# Patient Record
Sex: FEMALE | Race: NATIVE HAWAIIAN OR OTHER PACIFIC ISLANDER | NOT HISPANIC OR LATINO | Employment: UNEMPLOYED | URBAN - METROPOLITAN AREA
[De-identification: names, ages, dates, MRNs, and addresses within clinical notes are randomized per-mention and may not be internally consistent; named-entity substitution may affect disease eponyms.]

---

## 2017-02-05 ENCOUNTER — APPOINTMENT (OUTPATIENT)
Dept: GENERAL RADIOLOGY | Facility: CLINIC | Age: 22
End: 2017-02-05
Attending: NURSE PRACTITIONER
Payer: COMMERCIAL

## 2017-02-05 ENCOUNTER — HOSPITAL ENCOUNTER (EMERGENCY)
Facility: CLINIC | Age: 22
Discharge: HOME | End: 2017-02-05
Attending: NURSE PRACTITIONER | Admitting: NURSE PRACTITIONER
Payer: COMMERCIAL

## 2017-02-05 ENCOUNTER — APPOINTMENT (OUTPATIENT)
Dept: CT IMAGING | Facility: CLINIC | Age: 22
End: 2017-02-05
Attending: NURSE PRACTITIONER
Payer: COMMERCIAL

## 2017-02-05 VITALS
TEMPERATURE: 98 F | BODY MASS INDEX: 34.39 KG/M2 | SYSTOLIC BLOOD PRESSURE: 135 MMHG | OXYGEN SATURATION: 100 % | WEIGHT: 185 LBS | DIASTOLIC BLOOD PRESSURE: 82 MMHG | RESPIRATION RATE: 18 BRPM | HEART RATE: 76 BPM
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
## 2017-02-05 DIAGNOSIS — S46.811A STRAIN OF TRAPEZIUS MUSCLE, RIGHT, INITIAL ENCOUNTER: ICD-10-CM

## 2017-02-05 LAB
ANION GAP SERPL CALCULATED.3IONS-SCNC: 11 MMOL/L (ref 3–14)
BASOPHILS # BLD AUTO: 0 10E9/L (ref 0–0.2)
BASOPHILS NFR BLD AUTO: 0.4 %
BUN SERPL-MCNC: 14 MG/DL (ref 7–30)
CALCIUM SERPL-MCNC: 8.9 MG/DL (ref 8.5–10.1)
CHLORIDE SERPL-SCNC: 107 MMOL/L (ref 94–109)
CO2 SERPL-SCNC: 21 MMOL/L (ref 20–32)
CREAT SERPL-MCNC: 0.69 MG/DL (ref 0.52–1.04)
DIFFERENTIAL METHOD BLD: ABNORMAL
EOSINOPHIL # BLD AUTO: 0.2 10E9/L (ref 0–0.7)
EOSINOPHIL NFR BLD AUTO: 1.4 %
ERYTHROCYTE [DISTWIDTH] IN BLOOD BY AUTOMATED COUNT: 12.8 % (ref 10–15)
GFR SERPL CREATININE-BSD FRML MDRD: ABNORMAL ML/MIN/1.7M2
GLUCOSE SERPL-MCNC: 149 MG/DL (ref 70–99)
HCG UR QL: NEGATIVE
HCT VFR BLD AUTO: 42.7 % (ref 35–47)
HGB BLD-MCNC: 15 G/DL (ref 11.7–15.7)
IMM GRANULOCYTES # BLD: 0 10E9/L (ref 0–0.4)
IMM GRANULOCYTES NFR BLD: 0.1 %
LYMPHOCYTES # BLD AUTO: 2.2 10E9/L (ref 0.8–5.3)
LYMPHOCYTES NFR BLD AUTO: 19.7 %
MCH RBC QN AUTO: 27.7 PG (ref 26.5–33)
MCHC RBC AUTO-ENTMCNC: 35.1 G/DL (ref 31.5–36.5)
MCV RBC AUTO: 79 FL (ref 78–100)
MONOCYTES # BLD AUTO: 0.5 10E9/L (ref 0–1.3)
MONOCYTES NFR BLD AUTO: 4.8 %
NEUTROPHILS # BLD AUTO: 8.2 10E9/L (ref 1.6–8.3)
NEUTROPHILS NFR BLD AUTO: 73.6 %
PLATELET # BLD AUTO: 234 10E9/L (ref 150–450)
POTASSIUM SERPL-SCNC: 4.1 MMOL/L (ref 3.4–5.3)
RBC # BLD AUTO: 5.42 10E12/L (ref 3.8–5.2)
SODIUM SERPL-SCNC: 139 MMOL/L (ref 133–144)
WBC # BLD AUTO: 11.1 10E9/L (ref 4–11)

## 2017-02-05 PROCEDURE — 99284 EMERGENCY DEPT VISIT MOD MDM: CPT | Mod: 25

## 2017-02-05 PROCEDURE — 81025 URINE PREGNANCY TEST: CPT | Performed by: NURSE PRACTITIONER

## 2017-02-05 PROCEDURE — 25000132 ZZH RX MED GY IP 250 OP 250 PS 637: Performed by: NURSE PRACTITIONER

## 2017-02-05 PROCEDURE — 99284 EMERGENCY DEPT VISIT MOD MDM: CPT | Performed by: NURSE PRACTITIONER

## 2017-02-05 PROCEDURE — 72125 CT NECK SPINE W/O DYE: CPT

## 2017-02-05 PROCEDURE — 71101 X-RAY EXAM UNILAT RIBS/CHEST: CPT | Mod: TC,RT

## 2017-02-05 PROCEDURE — 85025 COMPLETE CBC W/AUTO DIFF WBC: CPT | Performed by: NURSE PRACTITIONER

## 2017-02-05 PROCEDURE — 80048 BASIC METABOLIC PNL TOTAL CA: CPT | Performed by: NURSE PRACTITIONER

## 2017-02-05 RX ORDER — ACETAMINOPHEN 325 MG/1
975 TABLET ORAL ONCE
Status: COMPLETED | OUTPATIENT
Start: 2017-02-05 | End: 2017-02-05

## 2017-02-05 RX ADMIN — ACETAMINOPHEN 975 MG: 325 TABLET ORAL at 12:34

## 2017-02-05 ASSESSMENT — ENCOUNTER SYMPTOMS
NECK PAIN: 1
HEADACHES: 1
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
## 2017-02-05 NOTE — ED AVS SNAPSHOT
[facility] Emergency Department    [address] [place] [address]    Phone:  [phone]    Fax:  [phone]                                       Rosa Vera   MRN: [id number]    Department:  [facility] Emergency Department   Date of Visit:  2/5/2017           After Visit Summary Signature Page     I have received my discharge instructions, and my questions have been answered. I have discussed any challenges I see with this plan with the nurse or doctor.    ..........................................................................................................................................  Patient/Patient Representative Signature      ..........................................................................................................................................  Patient Representative Print Name and Relationship to Patient    ..................................................               ................................................  Date                                            Time    ..........................................................................................................................................  Reviewed by Signature/Title    ...................................................              ..............................................  Date                                                            Time
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
## 2017-03-26 DIAGNOSIS — Z30.011 ENCOUNTER FOR INITIAL PRESCRIPTION OF CONTRACEPTIVE PILLS: ICD-10-CM

## 2017-03-27 NOTE — TELEPHONE ENCOUNTER
contraceptive      Last Written Prescription Date: 8/8/16  Last Fill Quantity: 84,  # refills: 2   Last Office Visit with G, [facility] or [facility] prescribing provider: 7/5/16

## 2017-03-28 RX ORDER — NORETHINDRONE ACETATE AND ETHINYL ESTRADIOL 1MG-20(21)
KIT ORAL
Qty: 84 TABLET | Refills: 0 | Status: SHIPPED | OUTPATIENT
Start: 2017-03-28 | End: 2017-06-19

## 2017-03-28 NOTE — TELEPHONE ENCOUNTER
Medication is being filled for 1 time refill only due to:  Patient needs to be seen because it has been more than one year since last visit.     Matias Macias RN, BSN

## 2017-09-28 DIAGNOSIS — Z30.011 ENCOUNTER FOR INITIAL PRESCRIPTION OF CONTRACEPTIVE PILLS: ICD-10-CM

## 2017-09-29 RX ORDER — NORETHINDRONE ACETATE AND ETHINYL ESTRADIOL 1MG-20(21)
KIT ORAL
Qty: 84 TABLET | Refills: 0 | Status: SHIPPED | OUTPATIENT
Start: 2017-09-29 | End: 2017-12-20

## 2017-09-29 NOTE — TELEPHONE ENCOUNTER
Routing refill request to provider for review/approval because:  Trini given x1 and patient did not follow up, please advise.    Routing to PCP for refill if appropriate.  Routing to schedulers for appointment with PCP.    Liat Lindsay RN

## 2017-09-29 NOTE — TELEPHONE ENCOUNTER
Left message for patient to call back and speak with any .  Thank you,  Lu Gavin  Patient Representative

## 2017-09-29 NOTE — TELEPHONE ENCOUNTER
Blisovi      Last Written Prescription Date: 7/28/2017  Last Fill Quantity: 84,  # refills: 0   Last Office Visit with G, [facility] or [facility] prescribing provider: 7/05/2016

## 2017-09-29 NOTE — TELEPHONE ENCOUNTER
Patient returned call and scheduled appt with PCP.  Thank you,  Lu Gavin  Patient Representative

## 2017-12-20 ENCOUNTER — OFFICE VISIT (OUTPATIENT)
Dept: FAMILY MEDICINE | Facility: CLINIC | Age: 22
End: 2017-12-20
Payer: COMMERCIAL

## 2017-12-20 VITALS
TEMPERATURE: 96.3 F | HEART RATE: 94 BPM | DIASTOLIC BLOOD PRESSURE: 72 MMHG | BODY MASS INDEX: 39.93 KG/M2 | SYSTOLIC BLOOD PRESSURE: 104 MMHG | WEIGHT: 217 LBS | OXYGEN SATURATION: 98 % | HEIGHT: 62 IN

## 2017-12-20 DIAGNOSIS — Z86.32 HISTORY OF GESTATIONAL DIABETES MELLITUS: ICD-10-CM

## 2017-12-20 DIAGNOSIS — Z00.00 ENCOUNTER FOR ROUTINE ADULT HEALTH EXAMINATION WITHOUT ABNORMAL FINDINGS: Primary | ICD-10-CM

## 2017-12-20 DIAGNOSIS — Z23 NEED FOR PROPHYLACTIC VACCINATION AND INOCULATION AGAINST INFLUENZA: ICD-10-CM

## 2017-12-20 DIAGNOSIS — Z13.6 CARDIOVASCULAR SCREENING; LDL GOAL LESS THAN 160: ICD-10-CM

## 2017-12-20 DIAGNOSIS — Z30.41 SURVEILLANCE OF CONTRACEPTIVE PILL: ICD-10-CM

## 2017-12-20 LAB
CHOLEST SERPL-MCNC: 161 MG/DL
HBA1C MFR BLD: 5.8 % (ref 4.3–6)
HDLC SERPL-MCNC: 46 MG/DL
LDLC SERPL CALC-MCNC: 93 MG/DL
NONHDLC SERPL-MCNC: 115 MG/DL
TRIGL SERPL-MCNC: 110 MG/DL

## 2017-12-20 PROCEDURE — 87591 N.GONORRHOEAE DNA AMP PROB: CPT | Performed by: FAMILY MEDICINE

## 2017-12-20 PROCEDURE — 80061 LIPID PANEL: CPT | Performed by: FAMILY MEDICINE

## 2017-12-20 PROCEDURE — 90471 IMMUNIZATION ADMIN: CPT | Performed by: FAMILY MEDICINE

## 2017-12-20 PROCEDURE — 99395 PREV VISIT EST AGE 18-39: CPT | Mod: 25 | Performed by: FAMILY MEDICINE

## 2017-12-20 PROCEDURE — 36415 COLL VENOUS BLD VENIPUNCTURE: CPT | Performed by: FAMILY MEDICINE

## 2017-12-20 PROCEDURE — 83036 HEMOGLOBIN GLYCOSYLATED A1C: CPT | Performed by: FAMILY MEDICINE

## 2017-12-20 PROCEDURE — 90686 IIV4 VACC NO PRSV 0.5 ML IM: CPT | Performed by: FAMILY MEDICINE

## 2017-12-20 PROCEDURE — 87491 CHLMYD TRACH DNA AMP PROBE: CPT | Performed by: FAMILY MEDICINE

## 2017-12-20 RX ORDER — NORETHINDRONE ACETATE AND ETHINYL ESTRADIOL 1MG-20(21)
KIT ORAL
Qty: 84 TABLET | Refills: 3 | Status: SHIPPED | OUTPATIENT
Start: 2017-12-20 | End: 2018-12-06

## 2017-12-20 ASSESSMENT — PAIN SCALES - GENERAL: PAINLEVEL: NO PAIN (0)

## 2017-12-20 NOTE — MR AVS SNAPSHOT
After Visit Summary   12/20/2017    Rosa Vera    MRN: [id number]           Patient Information     Date Of Birth          1995        Visit Information        Provider Department      12/20/2017 10:45 AM Louisa Isaac MD [facility]        Today's Diagnoses     History of gestational diabetes mellitus    -  1    Screening examination for venereal disease        Screening for malignant neoplasm of cervix        Need for HPV vaccine        Need for prophylactic vaccination and inoculation against influenza        Encounter for initial prescription of contraceptive pills        CARDIOVASCULAR SCREENING; LDL GOAL LESS THAN 160          Care Instructions      Preventive Health Recommendations  Female Ages 18 to 25     Yearly exam:     See your health care provider every year in order to  o Review health changes.   o Discuss preventive care.    o Review your medicines if your doctor has prescribed any.      You should be tested each year for STDs (sexually transmitted diseases).       After age 20, talk to your provider about how often you should have cholesterol testing.      Starting at age 21, get a Pap test every three years. If you have an abnormal result, your doctor may have you test more often.      If you are at risk for diabetes, you should have a diabetes test (fasting glucose).     Shots:     Get a flu shot each year.     Get a tetanus shot every 10 years.     Consider getting the shot (vaccine) that prevents cervical cancer (Gardasil).    Nutrition:     Eat at least 5 servings of fruits and vegetables each day.    Eat whole-grain bread, whole-wheat pasta and brown rice instead of white grains and rice.    Talk to your provider about Calcium and Vitamin D.     Lifestyle    Exercise at least 150 minutes a week each week (30 minutes a day, 5 days a week). This will help you control your weight and prevent disease.    Limit alcohol to one drink per day.    No  "smoking.     Wear sunscreen to prevent skin cancer.    See your dentist every six months for an exam and cleaning.          Follow-ups after your visit        Who to contact     If you have questions or need follow up information about today's clinic visit or your schedule please contact [facility] directly at [phone].  Normal or non-critical lab and imaging results will be communicated to you by MyChart, letter or phone within 4 business days after the clinic has received the results. If you do not hear from us within 7 days, please contact the clinic through maniaTVhart or phone. If you have a critical or abnormal lab result, we will notify you by phone as soon as possible.  Submit refill requests through [employer]ch[employer] or call your pharmacy and they will forward the refill request to us. Please allow 3 business days for your refill to be completed.          Additional Information About Your Visit        MyChart Information     [employer] gives you secure access to your electronic health record. If you see a primary care provider, you can also send messages to your care team and make appointments. If you have questions, please call your primary care clinic.  If you do not have a primary care provider, please call [phone] and they will assist you.        Care EveryWhere ID     This is your Care EveryWhere ID. This could be used by other organizations to access your [facility] medical records  [id number]        Your Vitals Were     Pulse Temperature Height Last Period Pulse Oximetry Breastfeeding?    94 96.3  F (35.7  C) (Temporal) 5' 1.5\" (1.562 m) 12/20/2017 (Exact Date) 98% No    BMI (Body Mass Index)                   40.34 kg/m2            Blood Pressure from Last 3 Encounters:   12/20/17 104/72   02/05/17 135/82   07/05/16 132/54    Weight from Last 3 Encounters:   12/20/17 217 lb (98.4 kg)   02/05/17 185 lb (83.9 kg)   07/05/16 190 lb 3.2 oz (86.3 kg)              We Performed the Following     " CHLAMYDIA TRACHOMATIS PCR     FLU VAC, SPLIT VIRUS IM > 3 YO (QUADRIVALENT) [97773]     Hemoglobin A1c     Lipid panel reflex to direct LDL Non-fasting     NEISSERIA GONORRHOEA PCR     Vaccine Administration, Initial [28558]          Today's Medication Changes          These changes are accurate as of: 12/20/17 12:00 PM.  If you have any questions, ask your nurse or doctor.               These medicines have changed or have updated prescriptions.        Dose/Directions    norethindrone-ethinyl estradiol 1-20 MG-MCG per tablet   Commonly known as:  BLISOVI FE 1/20   This may have changed:  See the new instructions.   Used for:  Encounter for initial prescription of contraceptive pills   Changed by:  Louisa Isaac MD        TAKE 1 TABLET DAILY (DUE FOR OFFICE VISIT PRIOR TO FURTHER REFILLS)   Quantity:  84 tablet   Refills:  3            Where to get your medicines      These medications were sent to [facility] - [address] [address]     Phone:  [phone]     norethindrone-ethinyl estradiol 1-20 MG-MCG per tablet                Primary Care Provider Office Phone # Fax #    Louisa Isaac [phone]       [address] [place] [address]        Equal Access to Services     JOSE HICKEY AH: Hadii eloy ku hadasho Soomaali, waaxda luqadaha, qaybta kaalmada adeegyada, waxay malloryin haylynn hill. So [facility] [phone].    ATENCIÓN: Si habla español, tiene a baer disposición servicios gratuitos de asistencia lingüística. Llame al [phone].    We comply with applicable federal civil rights laws and [place] laws. We do not discriminate on the basis of race, color, national origin, age, disability, sex, sexual orientation, or gender identity.            Thank you!     Thank you for choosing [facility]  for your care. Our goal is always to provide you with excellent  care. Hearing back from our patients is one way we can continue to improve our services. Please take a few minutes to complete the written survey that you may receive in the mail after your visit with us. Thank you!             Your Updated Medication List - Protect others around you: Learn how to safely use, store and throw away your medicines at www.disposemymeds.org.          This list is accurate as of: 12/20/17 12:00 PM.  Always use your most recent med list.                   Brand Name Dispense Instructions for use Diagnosis    norethindrone-ethinyl estradiol 1-20 MG-MCG per tablet    BLISOVI FE 1/20    84 tablet    TAKE 1 TABLET DAILY (DUE FOR OFFICE VISIT PRIOR TO FURTHER REFILLS)    Encounter for initial prescription of contraceptive pills

## 2017-12-20 NOTE — PROGRESS NOTES
SUBJECTIVE:   CC: Rosa Vera is an 22 year old woman who presents for preventive health visit.     Physical   Annual:     Getting at least 3 servings of Calcium per day::  NO    Bi-annual eye exam::  Yes    Dental care twice a year::  Yes    Sleep apnea or symptoms of sleep apnea::  None    Diet::  Regular (no restrictions)    Frequency of exercise::  None    Taking medications regularly::  Yes    Medication side effects::  None    Additional concerns today::  No          QUESTIONS/ CONCERNS: Patient does not have any questions/ concerns at this visit.       Today's PHQ-2 Score:   PHQ-2 ( 1999 Pfizer) 12/18/2017   Q1: Little interest or pleasure in doing things 0   Q2: Feeling down, depressed or hopeless 0   PHQ-2 Score 0   Q1: Little interest or pleasure in doing things Not at all   Q2: Feeling down, depressed or hopeless Not at all   PHQ-2 Score 0     Answers for HPI/ROS submitted by the patient on 12/18/2017   PHQ-2 Score: 0    Abuse: Current or Past(Physical, Sexual or Emotional)- No  Do you feel safe in your environment - Yes    Social History   Substance Use Topics     Smoking status: Never Smoker     Smokeless tobacco: Never Used      Comment: Mike MOORE smokes     Alcohol use No     Alcohol Use 12/18/2017   If you drink alcohol, do you typically have greater than 3 drinks per day OR greater than 7 drinks per week?   Not applicable   No flowsheet data found.      Reviewed orders with patient.  Reviewed health maintenance and updated orders accordingly - Yes      Mammogram not appropriate for this patient based on age.    Pertinent mammograms are reviewed under the imaging tab.  History of abnormal Pap smear: NO - age 21-29 PAP every 3 years recommended    Reviewed and updated as needed this visit by clinical staffTobacco  Allergies  Meds  Med Hx  Surg Hx  Fam Hx  Soc Hx        Reviewed and updated as needed this visit by Provider        Past Medical History:   Diagnosis Date     GDM, class A2  "12/2/2015     NO ACTIVE PROBLEMS (aka NONE)       Past Surgical History:   Procedure Laterality Date     NO HISTORY OF SURGERY         Review of Systems  C: NEGATIVE for fever, chills, change in weight  I: NEGATIVE for worrisome rashes, moles or lesions  E: NEGATIVE for vision changes or irritation  ENT: NEGATIVE for ear, mouth and throat problems  R: NEGATIVE for significant cough or SOB  B: NEGATIVE for masses, tenderness or discharge  CV: NEGATIVE for chest pain, palpitations or peripheral edema  GI: NEGATIVE for nausea, abdominal pain, heartburn, or change in bowel habits  : NEGATIVE for unusual urinary or vaginal symptoms. Periods are regular on OCPs.  M: NEGATIVE for significant arthralgias or myalgia  N: NEGATIVE for weakness, dizziness or paresthesias  P: NEGATIVE for changes in mood or affect     OBJECTIVE:   /72  Pulse 94  Temp 96.3  F (35.7  C) (Temporal)  Ht 5' 1.5\" (1.562 m)  Wt 217 lb (98.4 kg)  LMP 12/20/2017 (Exact Date)  SpO2 98%  Breastfeeding? No  BMI 40.34 kg/m2  Physical Exam  GENERAL: healthy, alert and no distress  EYES: Eyes grossly normal to inspection, PERRL and conjunctivae and sclerae normal  HENT: ear canals and TM's normal, nose and mouth without ulcers or lesions  NECK: no adenopathy, no asymmetry, masses, or scars and thyroid normal to palpation  RESP: lungs clear to auscultation - no rales, rhonchi or wheezes  BREAST: normal without masses, tenderness or nipple discharge and no palpable axillary masses or adenopathy  CV: regular rate and rhythm, normal S1 S2, no S3 or S4, no murmur, click or rub, no peripheral edema and peripheral pulses strong  ABDOMEN: soft, nontender, no hepatosplenomegaly, no masses and bowel sounds normal  MS: no gross musculoskeletal defects noted, no edema  SKIN: no suspicious lesions or rashes  NEURO: Normal strength and tone, mentation intact and speech normal  PSYCH: mentation appears normal, affect normal/bright  PELVIC: Not done - PAP not " due, no concerns.     Diagnostics:  Orders Placed This Encounter   Procedures     Hemoglobin A1c     Lipid panel reflex to direct LDL Non-fasting       ASSESSMENT/PLAN:       ICD-10-CM    1. Encounter for routine adult health examination without abnormal findings Z00.00 NEISSERIA GONORRHOEA PCR     CHLAMYDIA TRACHOMATIS PCR   2. History of gestational diabetes mellitus Z86.32 Hemoglobin A1c     Lipid panel reflex to direct LDL Non-fasting   3. Need for prophylactic vaccination and inoculation against influenza Z23 FLU VAC, SPLIT VIRUS IM > 3 YO (QUADRIVALENT) [93447]     Vaccine Administration, Initial [73600]   4. CARDIOVASCULAR SCREENING; LDL GOAL LESS THAN 160 Z13.6 Lipid panel reflex to direct LDL Non-fasting   5. Surveillance of contraceptive pill Z30.41 norethindrone-ethinyl estradiol (BLISOVI FE 1/20) 1-20 MG-MCG per tablet   6. Class 3 obesity due to excess calories without serious comorbidity with body mass index (BMI) of 40.0 to 44.9 in adult (H) E66.01     Z68.41        - PAP was NIL in 6/2015. PAP recommended every 3 years, will repeat in 2018.   - see lab orders, will notify with results and discuss further evaluation/ treatment if needed at that time.   - Refills given as needed, see orders.   - Mammogram recommended q 1-2 years beginning at age 40. Mammogram not indicated for patient due to age. Patient will continue with routine self breast exams and notify me with concerns.   - Colonoscopy recommended beginning at age 50. Colonoscopy not indicated for patient due to age. Patient will notify me with concerns.   - Influenza Vaccination given. Reviewed, vaccinations are otherwise UTD.   - Follow up for routine physical in 1 year, or sooner if needed.       COUNSELING:  Reviewed preventive health counseling, as reflected in patient instructions       Regular exercise       Healthy diet/nutrition       Vision screening       Hearing screening       Immunizations    Vaccinated for: Influenza          "Contraception       Family planning       Safe sex practices/STD prevention  Strongly recommend weight loss to reduce diabetic risk and other CV disease risk.      reports that she has never smoked. She has never used smokeless tobacco.    Estimated body mass index is 40.34 kg/(m^2) as calculated from the following:    Height as of this encounter: 5' 1.5\" (1.562 m).    Weight as of this encounter: 217 lb (98.4 kg).   Weight management plan: Discussed healthy diet and exercise guidelines and patient will follow up in 12 months in clinic to re-evaluate.    Counseling Resources:  ATP IV Guidelines  Pooled Cohorts Equation Calculator  Breast Cancer Risk Calculator  FRAX Risk Assessment  ICSI Preventive Guidelines  Dietary Guidelines for Americans, 2010  [employer]'s MyPlate  ASA Prophylaxis  Lung CA Screening    This document serves as a record of services personally performed by Louisa Isaac MD. It was created on their behalf by Kat Hogan, a trained medical scribe. The creation of this record is based on the provider's personal observations and the statements of the patient. This document has been checked and approved by the attending provider.    Louisa Isaac MD  [facility]      Injectable Influenza Immunization Documentation    1.  Is the person to be vaccinated sick today?   No    2. Does the person to be vaccinated have an allergy to a component   of the vaccine?   No  Egg Allergy Algorithm Link    3. Has the person to be vaccinated ever had a serious reaction   to influenza vaccine in the past?   No    4. Has the person to be vaccinated ever had Guillain-Barré syndrome?   No    Form completed by Jigna Moy CMA           "

## 2017-12-20 NOTE — PROGRESS NOTES
"Rosa, your blood tests look great. Your diabetes test is MUCH better than the last one.  Also your cholesterol is very good, except your HDL \"good\" cholesterol is low. I'd like to see this higher.  You can increase this over time with more exercise.  Now your priority is to keep these good, and prevent changes over time that will increase your risk of type 2 diabetes, as we talked about at your visit. Have a Kaycee Albright!   Louisa Isaac MD"

## 2017-12-21 LAB
C TRACH DNA SPEC QL NAA+PROBE: NEGATIVE
N GONORRHOEA DNA SPEC QL NAA+PROBE: NEGATIVE
SPECIMEN SOURCE: NORMAL
SPECIMEN SOURCE: NORMAL

## 2017-12-27 PROBLEM — Z30.41 SURVEILLANCE OF CONTRACEPTIVE PILL: Status: ACTIVE | Noted: 2017-12-27

## 2018-04-13 ENCOUNTER — OFFICE VISIT (OUTPATIENT)
Dept: URGENT CARE | Facility: RETAIL CLINIC | Age: 23
End: 2018-04-13
Payer: COMMERCIAL

## 2018-04-13 VITALS
DIASTOLIC BLOOD PRESSURE: 83 MMHG | SYSTOLIC BLOOD PRESSURE: 130 MMHG | HEART RATE: 113 BPM | TEMPERATURE: 98.8 F | OXYGEN SATURATION: 98 %

## 2018-04-13 DIAGNOSIS — J20.9 ACUTE BRONCHITIS WITH SYMPTOMS > 10 DAYS: Primary | ICD-10-CM

## 2018-04-13 PROCEDURE — 99203 OFFICE O/P NEW LOW 30 MIN: CPT | Performed by: FAMILY MEDICINE

## 2018-04-13 RX ORDER — AZITHROMYCIN 250 MG/1
TABLET, FILM COATED ORAL
Qty: 6 TABLET | Refills: 0 | Status: SHIPPED | OUTPATIENT
Start: 2018-04-13 | End: 2018-10-29

## 2018-04-13 RX ORDER — FLUTICASONE PROPIONATE 220 UG/1
2 AEROSOL, METERED RESPIRATORY (INHALATION) 2 TIMES DAILY
Qty: 1 INHALER | Refills: 1 | Status: SHIPPED | OUTPATIENT
Start: 2018-04-13 | End: 2018-10-29

## 2018-04-13 NOTE — MR AVS SNAPSHOT
After Visit Summary   4/13/2018    Rosa Vera    MRN: [id number]           Patient Information     Date Of Birth          1995        Visit Information        Provider Department      4/13/2018 7:00 PM Juaquin Caceres MD [facility]'s Diagnoses     Acute bronchitis with symptoms > 10 days    -  1       Follow-ups after your visit        Who to contact     You can reach your care team any time of the day by calling [phone].  Notification of test results:  If you have an abnormal lab result, we will notify you by phone as soon as possible.         Additional Information About Your Visit        MyChart Information     [employer] gives you secure access to your electronic health record. If you see a primary care provider, you can also send messages to your care team and make appointments. If you have questions, please call your primary care clinic.  If you do not have a primary care provider, please call [phone] and they will assist you.        Care EveryWhere ID     This is your Care EveryWhere ID. This could be used by other organizations to access your [facility] medical records  [id number]        Your Vitals Were     Pulse Temperature Pulse Oximetry             113 98.8  F (37.1  C) (Tympanic) 98%          Blood Pressure from Last 3 Encounters:   04/13/18 130/83   12/20/17 104/72   02/05/17 135/82    Weight from Last 3 Encounters:   12/20/17 217 lb (98.4 kg)   02/05/17 185 lb (83.9 kg)   07/05/16 190 lb 3.2 oz (86.3 kg)              Today, you had the following     No orders found for display         Today's Medication Changes          These changes are accurate as of 4/13/18  7:18 PM.  If you have any questions, ask your nurse or doctor.               Start taking these medicines.        Dose/Directions    azithromycin 250 MG tablet   Commonly known as:  ZITHROMAX   Used for:  Acute bronchitis with symptoms > 10 days        Two tablets first day, then  one tablet daily for four days.   Quantity:  6 tablet   Refills:  0       fluticasone 220 MCG/ACT Inhaler   Commonly known as:  FLOVENT HFA   Used for:  Acute bronchitis with symptoms > 10 days        Dose:  2 puff   Inhale 2 puffs into the lungs 2 times daily   Quantity:  1 Inhaler   Refills:  1            Where to get your medicines      These medications were sent to [address], [place] - [address]  [address], [facility] [address]     Phone:  [phone]     azithromycin 250 MG tablet    fluticasone 220 MCG/ACT Inhaler                Primary Care Provider Office Phone # Fax #    Louisa Mariajose Isaac [phone]       [address] [place] [address]        Equal Access to Services     JOSE HICKEY : Hadii eloy lozano hadasho Soomaali, waaxda luqadaha, qaybta kaalmada adeegyada, luis m singleton . So [facility] [phone].    ATENCIÓN: Si habla español, tiene a baer disposición servicios gratuitos de asistencia lingüística. [facility] [phone].    We comply with applicable federal civil rights laws and [place] laws. We do not discriminate on the basis of race, color, national origin, age, disability, sex, sexual orientation, or gender identity.            Thank you!     Thank you for choosing [facility]  for your care. Our goal is always to provide you with excellent care. Hearing back from our patients is one way we can continue to improve our services. Please take a few minutes to complete the written survey that you may receive in the mail after your visit with us. Thank you!             Your Updated Medication List - Protect others around you: Learn how to safely use, store and throw away your medicines at www.disposemymeds.org.          This list is accurate as of 4/13/18  7:18 PM.  Always use your most recent med list.                   Brand Name Dispense Instructions for use Diagnosis    azithromycin 250 MG tablet     ZITHROMAX    6 tablet    Two tablets first day, then one tablet daily for four days.    Acute bronchitis with symptoms > 10 days       fluticasone 220 MCG/ACT Inhaler    FLOVENT HFA    1 Inhaler    Inhale 2 puffs into the lungs 2 times daily    Acute bronchitis with symptoms > 10 days       norethindrone-ethinyl estradiol 1-20 MG-MCG per tablet    BLISOVI FE 1/20    84 tablet    TAKE 1 TABLET DAILY (DUE FOR OFFICE VISIT PRIOR TO FURTHER REFILLS)    Surveillance of contraceptive pill

## 2018-04-13 NOTE — NURSING NOTE
"Chief Complaint   Patient presents with     Cough     cough x 10 day, mucus in the am dry during the evening. Up at night coughing. chest is tight        Initial /83  Pulse 113  Temp 98.8  F (37.1  C) (Tympanic)  SpO2 98% Estimated body mass index is 40.34 kg/(m^2) as calculated from the following:    Height as of 12/20/17: 5' 1.5\" (1.562 m).    Weight as of 12/20/17: 217 lb (98.4 kg).  Medication Reconciliation: complete     Jessica Sundet      "

## 2018-04-14 NOTE — PROGRESS NOTES
SUBJECTIVE:  Rosa Vera is a 23 year old female who presents to the clinic today with a chief complaint of cough  and pleuritic chest pain for 2 week(s).  Her cough is described as persistent, daytime, nightime and productive of yellow sputum.    The patient's symptoms are severe and worsening.  Associated symptoms include laryngitis. The patient's symptoms are exacerbated by no particular triggers  Patient has been using decongestants, ibuprofen and OTC cough suppressants  to improve symptoms.    Past Medical History:   Diagnosis Date     GDM, class A2 12/2/2015     NO ACTIVE PROBLEMS (aka NONE)      Current Outpatient Prescriptions   Medication Sig Dispense Refill     fluticasone (FLOVENT HFA) 220 MCG/ACT Inhaler Inhale 2 puffs into the lungs 2 times daily 1 Inhaler 1     azithromycin (ZITHROMAX) 250 MG tablet Two tablets first day, then one tablet daily for four days. 6 tablet 0     norethindrone-ethinyl estradiol (BLISOVI FE 1/20) 1-20 MG-MCG per tablet TAKE 1 TABLET DAILY (DUE FOR OFFICE VISIT PRIOR TO FURTHER REFILLS) 84 tablet 3     History   Smoking Status     Never Smoker   Smokeless Tobacco     Never Used     Comment: S.OSuzanne, Mike smokes       ROS  Review of systems negative except as stated above.    OBJECTIVE:  /83  Pulse 113  Temp 98.8  F (37.1  C) (Tympanic)  SpO2 98%  GENERAL APPEARANCE: alert, moderate distress, cooperative and paroxysms of cough  EYES: EOMI,  PERRL, conjunctiva clear  HENT: ear canals and TM's normal.  Nose and mouth without ulcers, erythema or lesions  NECK: supple, nontender, no lymphadenopathy  RESP: lungs clear to auscultation - no rales, rhonchi or wheezes  CV: regular rates and rhythm, normal S1 S2, no murmur noted  ABDOMEN:  soft, nontender, no HSM or masses and bowel sounds normal  NEURO: Normal strength and tone, sensory exam grossly normal,  normal speech and mentation  SKIN: no suspicious lesions or rashes    ASSESSMENT:    Acute  Bronchitis  Cough  laryngotracheobronchitis    PLAN:  Zpack and Flovent, rest  Symptomatic measures encouraged, humidified air, plenty of fluids.  Follow up with primary care provider if no improvement.

## 2018-08-14 ENCOUNTER — HEALTH MAINTENANCE LETTER (OUTPATIENT)
Age: 23
End: 2018-08-14

## 2018-10-29 ENCOUNTER — RADIANT APPOINTMENT (OUTPATIENT)
Dept: GENERAL RADIOLOGY | Facility: OTHER | Age: 23
End: 2018-10-29
Attending: PHYSICAL MEDICINE & REHABILITATION
Payer: COMMERCIAL

## 2018-10-29 ENCOUNTER — OFFICE VISIT (OUTPATIENT)
Dept: ORTHOPEDICS | Facility: OTHER | Age: 23
End: 2018-10-29
Payer: COMMERCIAL

## 2018-10-29 VITALS
WEIGHT: 224 LBS | DIASTOLIC BLOOD PRESSURE: 72 MMHG | BODY MASS INDEX: 42.29 KG/M2 | HEIGHT: 61 IN | SYSTOLIC BLOOD PRESSURE: 100 MMHG

## 2018-10-29 DIAGNOSIS — S93.601A SPRAIN OF RIGHT FOOT, INITIAL ENCOUNTER: Primary | ICD-10-CM

## 2018-10-29 DIAGNOSIS — M79.671 RIGHT FOOT PAIN: ICD-10-CM

## 2018-10-29 PROCEDURE — 73630 X-RAY EXAM OF FOOT: CPT | Mod: RT

## 2018-10-29 PROCEDURE — 99203 OFFICE O/P NEW LOW 30 MIN: CPT | Performed by: PHYSICAL MEDICINE & REHABILITATION

## 2018-10-29 NOTE — LETTER
October 29, 2018      Rosa Vera  [address] [address] [place] [address]        To Whom It May Concern:    Rosa Vera  was seen on 10/29/18 for a foot injury. She may return to work on 10/29/18 with the following restrictions:    Please allow 10 minute seated rest break every 2 hours.     Please also excuse her for her tardiness today due to this appointment.         Sincerely,        Shadia Thomas MD, CAQ

## 2018-10-29 NOTE — LETTER
10/29/2018         RE: Rosa Vera  [address] [address] [place] [address]        Dear Colleague,    Thank you for referring your patient, Rosa Vera, to the [facility]. Please see a copy of my visit note below.    Sports Medicine Clinic Visit    PCP: Louisa Isaac    CC: Patient presents with:  Right Ankle - Pain      HPI:  Rosa Vera is a 23 year old female who is seen as a self referral.  She notes right ankle pain due to an injury last night, 10/28/2018 when she was going down the stairs and stepped on a dog treat and rolled her ankle (inverted). She notes pain over the 4th and 5th metatarsal.  She rates the pain at a  8/10 at its worst and a 6/10 currently.  Symptoms are relieved with non weight bearing and ice. Symptoms are worsened by standing and walking. She endorses swelling and numbness.   She denies bruising, popping, grinding, catching, locking, instability, tingling and weakness.  Other treatment has included nothing.      Review of Systems:  Musculoskeletal: as above  Remainder of review of systems is negative including constitutional, eyes, ENT, CV, pulmonary, GI, , endocrine, skin, hematologic, and neurologic except as noted in HPI or medical history.    History reviewed. No pertinent past surgical/medical/family/social history other than as mentioned in HPI.    Patient Active Problem List   Diagnosis     Edema     History of gestational diabetes mellitus     Class 3 obesity due to excess calories without serious comorbidity with body mass index (BMI) of 40.0 to 44.9 in adult     Surveillance of contraceptive pill     Past Medical History:   Diagnosis Date     GDM, class A2 12/2/2015     NO ACTIVE PROBLEMS (aka NONE)      Past Surgical History:   Procedure Laterality Date     NO HISTORY OF SURGERY       Family History   Problem Relation Age of Onset     Hypertension Father      Diabetes Father      Diabetes Maternal Grandmother      Diabetes Other       "Cousin, GDM and Type 2     Social History     Social History     Marital status: Single     Spouse name: Mike     Number of children: 0     Years of education: N/A     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[employer]     Social History Main Topics     Smoking status: Never Smoker     Smokeless tobacco: Never Used      Comment: Mike MOORE smokes     Alcohol use No     Drug use: No     Sexual activity: Yes     Partners: Male     Other Topics Concern      Service No     Blood Transfusions No     Caffeine Concern No     Occupational Exposure Yes     Works at [employer] in [place]     HobNusocket Hazards No     Sleep Concern No     Stress Concern No     Weight Concern No     Special Diet No     Back Care No     Exercise Yes     Advised exercising 30 minutes/day at least 5 days/week     Seat Belt Yes     Social History Narrative    Lives in [place] with S.Mike JEREZ.  No indoor cats/kittens.  Mike smokes.  Discussed risks of exposure secondhand smoke in pregnancy.  No concerns about domestic violence.       She works at [employer]    Current Outpatient Prescriptions   Medication     norethindrone-ethinyl estradiol (BLISOVI FE 1/20) 1-20 MG-MCG per tablet     No current facility-administered medications for this visit.      No Known Allergies      Objective:  /72  Ht 5' 1.5\" (1.562 m)  Wt 224 lb (101.6 kg)  BMI 41.64 kg/m2    General: Alert and in no distress    Head: Normocephalic, atraumatic  Eyes: no scleral icterus or conjunctival erythema   Oropharynx:  Mucous membranes moist  Skin: no erythema, petechiae, or jaundice  CV: regular rhythm by palpation, 2+ distal pulses  Resp: normal respiratory effort without conversational dyspnea   Psych: normal mood and affect    Gait: Antalgic  Neuro: Motor strength and sensation as noted below    Musculoskeletal:    Bilateral Foot and Ankle Exam:    Inspection:       no visible ecchymosis       no visible edema or effusion    Palpation:  Tender over the right " proximal 4th and 5th metatarsals and lateral midfoot    ROM:        Full active ROM with ankle dorsiflexion, plantarflexion, inversion, eversion, great toe dorsiflexion, and great toe plantarflexion.  Right ankle dorsiflexion, plantarflexion, and eversion are painful.    Strength:       ankle dorsiflexion 5-/5 right, 5/5 left       plantarflexion 5-/5 right, 5/5 left       inversion 5/5 bilaterally       eversion 5-/5 right, 5/5 left       great toe dorsiflexion 5/5 bilaterally       great toe plantarflexion 5/5 bilaterally    Neurovascular:       2+ peripheral pulses bilaterally        sensation grossly intact    Radiology:  X-rays ordered and independent visualization of images performed and reviewed with Rosa.  No acute osseous abnormality seen.   Full radiology report to follow.      Assessment:  1. Sprain of right foot, initial encounter        Plan:  Discussed the assessment with the patient and developed a plan together:  -Work: allow 10 minute seated rest break every 2 hours. Letter provided.  -Walking boot with walking and standing, may remove for bathing and when sedentary  -Ice or ice massage 15-20 minutes for pain relief or swelling as needed  -Patient's preferred over the counter medication as directed on packaging as needed for pain or soreness.    Follow Up: 1 week or sooner if symptoms fail to improve or worsen. Please call with any questions or concerns.       Mimi Thomas MD, [facility] Sports Medicine  [facility]      Again, thank you for allowing me to participate in the care of your patient.        Sincerely,        Shadia Thomas MD

## 2018-10-29 NOTE — PROGRESS NOTES
Sports Medicine Clinic Visit    PCP: Louisa Isaac    CC: Patient presents with:  Right Ankle - Pain      HPI:  Rosa Vera is a 23 year old female who is seen as a self referral.  She notes right ankle pain due to an injury last night, 10/28/2018 when she was going down the stairs and stepped on a dog treat and rolled her ankle (inverted). She notes pain over the 4th and 5th metatarsal.  She rates the pain at a  8/10 at its worst and a 6/10 currently.  Symptoms are relieved with non weight bearing and ice. Symptoms are worsened by standing and walking. She endorses swelling and numbness.   She denies bruising, popping, grinding, catching, locking, instability, tingling and weakness.  Other treatment has included nothing.      Review of Systems:  Musculoskeletal: as above  Remainder of review of systems is negative including constitutional, eyes, ENT, CV, pulmonary, GI, , endocrine, skin, hematologic, and neurologic except as noted in HPI or medical history.    History reviewed. No pertinent past surgical/medical/family/social history other than as mentioned in HPI.    Patient Active Problem List   Diagnosis     Edema     History of gestational diabetes mellitus     Class 3 obesity due to excess calories without serious comorbidity with body mass index (BMI) of 40.0 to 44.9 in adult     Surveillance of contraceptive pill     Past Medical History:   Diagnosis Date     GDM, class A2 12/2/2015     NO ACTIVE PROBLEMS (aka NONE)      Past Surgical History:   Procedure Laterality Date     NO HISTORY OF SURGERY       Family History   Problem Relation Age of Onset     Hypertension Father      Diabetes Father      Diabetes Maternal Grandmother      Diabetes Other      Cousin, GDM and Type 2     Social History     Social History     Marital status: Single     Spouse name: Mike     Number of children: 0     Years of education: N/A     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[employer]     Social  "History Main Topics     Smoking status: Never Smoker     Smokeless tobacco: Never Used      Comment: Mike MOORE smokes     Alcohol use No     Drug use: No     Sexual activity: Yes     Partners: Male     Other Topics Concern      Service No     Blood Transfusions No     Caffeine Concern No     Occupational Exposure Yes     Works at [employer] in [place]     Hobby Hazards No     Sleep Concern No     Stress Concern No     Weight Concern No     Special Diet No     Back Care No     Exercise Yes     Advised exercising 30 minutes/day at least 5 days/week     Seat Belt Yes     Social History Narrative    Lives in [place] with Mike MOORE.  No indoor cats/kittens.  Mike smokes.  Discussed risks of exposure secondhand smoke in pregnancy.  No concerns about domestic violence.       She works at [employer]    Current Outpatient Prescriptions   Medication     norethindrone-ethinyl estradiol (BLISOVI FE 1/20) 1-20 MG-MCG per tablet     No current facility-administered medications for this visit.      No Known Allergies      Objective:  /72  Ht 5' 1.5\" (1.562 m)  Wt 224 lb (101.6 kg)  BMI 41.64 kg/m2    General: Alert and in no distress    Head: Normocephalic, atraumatic  Eyes: no scleral icterus or conjunctival erythema   Oropharynx:  Mucous membranes moist  Skin: no erythema, petechiae, or jaundice  CV: regular rhythm by palpation, 2+ distal pulses  Resp: normal respiratory effort without conversational dyspnea   Psych: normal mood and affect    Gait: Antalgic  Neuro: Motor strength and sensation as noted below    Musculoskeletal:    Bilateral Foot and Ankle Exam:    Inspection:       no visible ecchymosis       no visible edema or effusion    Palpation:  Tender over the right proximal 4th and 5th metatarsals and lateral midfoot    ROM:        Full active ROM with ankle dorsiflexion, plantarflexion, inversion, eversion, great toe dorsiflexion, and great toe plantarflexion.  Right ankle dorsiflexion, " plantarflexion, and eversion are painful.    Strength:       ankle dorsiflexion 5-/5 right, 5/5 left       plantarflexion 5-/5 right, 5/5 left       inversion 5/5 bilaterally       eversion 5-/5 right, 5/5 left       great toe dorsiflexion 5/5 bilaterally       great toe plantarflexion 5/5 bilaterally    Neurovascular:       2+ peripheral pulses bilaterally        sensation grossly intact    Radiology:  X-rays ordered and independent visualization of images performed and reviewed with Rosa.  No acute osseous abnormality seen.   Full radiology report to follow.      Assessment:  1. Sprain of right foot, initial encounter        Plan:  Discussed the assessment with the patient and developed a plan together:  -Work: allow 10 minute seated rest break every 2 hours. Letter provided.  -Walking boot with walking and standing, may remove for bathing and when sedentary  -Ice or ice massage 15-20 minutes for pain relief or swelling as needed  -Patient's preferred over the counter medication as directed on packaging as needed for pain or soreness.    Follow Up: 1 week or sooner if symptoms fail to improve or worsen. Please call with any questions or concerns.       Mimi Thomas MD, CAQ Sports Medicine  [facility]

## 2018-10-29 NOTE — MR AVS SNAPSHOT
After Visit Summary   10/29/2018    Rosa Vera    MRN: [id number]           Patient Information     Date Of Birth          1995        Visit Information        Provider Department      10/29/2018 12:00 PM Shadia Thomas MD [facility]        Today's Diagnoses     Right foot pain    -  1      Care Instructions    Today's Plan of Care:  -Work: allow 10 minute seated rest break every 2 hours. Letter provided.  -Walking boot with walking and standing, may remove for bathing and sleeping  -Ice or ice massage 15-20 minutes for pain relief or swelling as needed  -Patient's preferred over the counter medication as directed on packaging as needed for pain or soreness.    Follow Up: 1 week or sooner if symptoms fail to improve or worsen. Please call with any questions or concerns.               Follow-ups after your visit        Who to contact     If you have questions or need follow up information about today's clinic visit or your schedule please contact [facility] [place] directly at [phone].  Normal or non-critical lab and imaging results will be communicated to you by [employer], letter or phone within 4 business days after the clinic has received the results. If you do not hear from us within 7 days, please contact the clinic through [employer] or phone. If you have a critical or abnormal lab result, we will notify you by phone as soon as possible.  Submit refill requests through [employer] or call your pharmacy and they will forward the refill request to us. Please allow 3 business days for your refill to be completed.          Additional Information About Your Visit        [employer] Information     [employer] gives you secure access to your electronic health record. If you see a primary care provider, you can also send messages to your care team and make appointments. If you have questions, please call your primary care clinic.  If you do not have a primary care provider,  "please call [phone] and they will assist you.        Care EveryWhere ID     This is your Care EveryWhere ID. This could be used by other organizations to access your [facility] medical records  [id number]        Your Vitals Were     Height BMI (Body Mass Index)                5' 1.5\" (1.562 m) 41.64 kg/m2           Blood Pressure from Last 3 Encounters:   10/29/18 100/72   04/13/18 130/83   12/20/17 104/72    Weight from Last 3 Encounters:   10/29/18 224 lb (101.6 kg)   12/20/17 217 lb (98.4 kg)   02/05/17 185 lb (83.9 kg)               Primary Care Provider Office Phone # Fax #    Louisa Mariajose Isaac [phone] [address] [place] [address]        Equal Access to Services     [facility]: Hadii aad ku hadasho Soomaali, waaxda luqadaha, qaybta kaalmada adeegyada, waxay malloryin hayaan elisha orbledon . So [facility] [phone].    ATENCIÓN: Si habla español, tiene a baer disposición servicios gratuitos de asistencia lingüística. Eun al [phone].    We comply with applicable federal civil rights laws and [place] laws. We do not discriminate on the basis of race, color, national origin, age, disability, sex, sexual orientation, or gender identity.            Thank you!     Thank you for choosing [facility]  for your care. Our goal is always to provide you with excellent care. Hearing back from our patients is one way we can continue to improve our services. Please take a few minutes to complete the written survey that you may receive in the mail after your visit with us. Thank you!             Your Updated Medication List - Protect others around you: Learn how to safely use, store and throw away your medicines at www.disposemymeds.org.          This list is accurate as of 10/29/18 12:33 PM.  Always use your most recent med list.                   Brand Name Dispense Instructions for use Diagnosis    norethindrone-ethinyl estradiol 1-20 MG-MCG per " tablet    BLISOVI FE 1/20    84 tablet    TAKE 1 TABLET DAILY (DUE FOR OFFICE VISIT PRIOR TO FURTHER REFILLS)    Surveillance of contraceptive pill

## 2018-10-29 NOTE — PATIENT INSTRUCTIONS
Today's Plan of Care:  -Work: allow 10 minute seated rest break every 2 hours. Letter provided.  -Walking boot with walking and standing, may remove for bathing and when sedentary  -Ice or ice massage 15-20 minutes for pain relief or swelling as needed  -Patient's preferred over the counter medication as directed on packaging as needed for pain or soreness.    Follow Up: 1 week or sooner if symptoms fail to improve or worsen. Please call with any questions or concerns.

## 2018-12-06 ENCOUNTER — TELEPHONE (OUTPATIENT)
Dept: FAMILY MEDICINE | Facility: CLINIC | Age: 23
End: 2018-12-06

## 2018-12-06 DIAGNOSIS — Z30.41 SURVEILLANCE OF CONTRACEPTIVE PILL: ICD-10-CM

## 2018-12-06 NOTE — LETTER
December 18, 2018      Rosa Vera  [address] [address] [place] [address]        Dear Rosa,     We have tried to call you. Unfortunately, Louisa Isaac MD schedule is full for the rest of this month. Patient will need to see an available provider if possible or wait until her new ins. Starts.       Sincerely,        Louisa Isaac MD

## 2018-12-06 NOTE — LETTER
[address] [address]  [phone]        December 10, 2018    Rosa Vera  [address] [address] [place] [address]          Dear Rosa,    You have requested a medication refill.  You will need an office visit before more refills will be given.    If you have any questions or problems, please give us a call at the clinic.    Sincerely,        Louisa Isaac M.D./LADAN young

## 2018-12-07 RX ORDER — NORETHINDRONE ACETATE AND ETHINYL ESTRADIOL 1MG-20(21)
KIT ORAL
Qty: 28 TABLET | Refills: 0 | Status: SHIPPED | OUTPATIENT
Start: 2018-12-07

## 2018-12-07 NOTE — TELEPHONE ENCOUNTER
"Requested Prescriptions   Pending Prescriptions Disp Refills     BLISOVI FE 1/20 1-20 MG-MCG tablet [Pharmacy Med Name: BLISOVI FE 1/20 TABS JESS 28'S 1/20] 84 tablet 3    Last Written Prescription Date:  12/200/17  Last Fill Quantity: 84,  # refills: 3   Last office visit: 12/20/2017 with prescribing provider:     Future Office Visit:     Sig: TAKE 1 TABLET DAILY (DUE FOR OFFICE VISIT PRIOR TO FURTHER REFILLS)    Contraceptives Protocol Passed    12/6/2018  8:17 PM       Passed - Patient is not a current smoker if age is 35 or older       Passed - Recent (12 mo) or future (30 days) visit within the authorizing provider's specialty    Patient had office visit in the last 12 months or has a visit in the next 30 days with authorizing provider or within the authorizing provider's specialty.  See \"Patient Info\" tab in inbasket, or \"Choose Columns\" in Meds & Orders section of the refill encounter.             Passed - No active pregnancy on record       Passed - No positive pregnancy test in past 12 months        Rx refilled per RN protocol.  1 month    Will forward to schedulers to schedule patient for OV.  Arlyn Angulo RN      "

## 2018-12-10 NOTE — TELEPHONE ENCOUNTER
2nd attempt to reach pt- left another message. Routing back to team to send letter.  Thank you,  Machelle Gaffney- Pt Rep.

## 2018-12-14 NOTE — TELEPHONE ENCOUNTER
Unfortunately, Louisa Isaac MD schedule is full for the rest of this month. Patient will need to see an available provider if possible or wait until her new ins. Starts. Please contact patient to advise.  Jigna Moy CMA

## 2020-03-02 ENCOUNTER — HEALTH MAINTENANCE LETTER (OUTPATIENT)
Age: 25
End: 2020-03-02

## 2020-12-20 ENCOUNTER — HEALTH MAINTENANCE LETTER (OUTPATIENT)
Age: 25
End: 2020-12-20

## 2021-04-24 ENCOUNTER — HEALTH MAINTENANCE LETTER (OUTPATIENT)
Age: 26
End: 2021-04-24

## 2021-09-27 ENCOUNTER — HOSPITAL ENCOUNTER (EMERGENCY)
Facility: CLINIC | Age: 26
Discharge: HOME | End: 2021-09-27
Attending: EMERGENCY MEDICINE | Admitting: EMERGENCY MEDICINE
Payer: OTHER GOVERNMENT

## 2021-09-27 ENCOUNTER — APPOINTMENT (OUTPATIENT)
Dept: GENERAL RADIOLOGY | Facility: CLINIC | Age: 26
End: 2021-09-27
Attending: EMERGENCY MEDICINE
Payer: OTHER GOVERNMENT

## 2021-09-27 ENCOUNTER — NURSE TRIAGE (OUTPATIENT)
Dept: NURSING | Facility: CLINIC | Age: 26
End: 2021-09-27

## 2021-09-27 VITALS
RESPIRATION RATE: 21 BRPM | HEIGHT: 62 IN | TEMPERATURE: 99.4 F | BODY MASS INDEX: 42.14 KG/M2 | DIASTOLIC BLOOD PRESSURE: 77 MMHG | OXYGEN SATURATION: 96 % | SYSTOLIC BLOOD PRESSURE: 120 MMHG | WEIGHT: 229 LBS | HEART RATE: 97 BPM

## 2021-09-27 DIAGNOSIS — U07.1 2019 NOVEL CORONAVIRUS DISEASE (COVID-19): ICD-10-CM

## 2021-09-27 LAB
ALBUMIN SERPL-MCNC: 3 G/DL (ref 3.4–5)
ALP SERPL-CCNC: 77 U/L (ref 40–150)
ALT SERPL W P-5'-P-CCNC: 28 U/L (ref 0–50)
ANION GAP SERPL CALCULATED.3IONS-SCNC: 7 MMOL/L (ref 3–14)
AST SERPL W P-5'-P-CCNC: 44 U/L (ref 0–45)
BASE EXCESS BLDV CALC-SCNC: 1 MMOL/L (ref -7.7–1.9)
BASOPHILS # BLD AUTO: 0 10E3/UL (ref 0–0.2)
BASOPHILS NFR BLD AUTO: 0 %
BILIRUB SERPL-MCNC: 0.7 MG/DL (ref 0.2–1.3)
BUN SERPL-MCNC: 7 MG/DL (ref 7–30)
CALCIUM SERPL-MCNC: 8.3 MG/DL (ref 8.5–10.1)
CHLORIDE BLD-SCNC: 99 MMOL/L (ref 94–109)
CO2 SERPL-SCNC: 25 MMOL/L (ref 20–32)
CREAT SERPL-MCNC: 0.69 MG/DL (ref 0.52–1.04)
CRP SERPL-MCNC: 160 MG/L (ref 0–8)
EOSINOPHIL # BLD AUTO: 0 10E3/UL (ref 0–0.7)
EOSINOPHIL NFR BLD AUTO: 0 %
ERYTHROCYTE [DISTWIDTH] IN BLOOD BY AUTOMATED COUNT: 12.1 % (ref 10–15)
ERYTHROCYTE [SEDIMENTATION RATE] IN BLOOD BY WESTERGREN METHOD: 57 MM/HR (ref 0–20)
GFR SERPL CREATININE-BSD FRML MDRD: >90 ML/MIN/1.73M2
GLUCOSE BLD-MCNC: 260 MG/DL (ref 70–99)
HCO3 BLDV-SCNC: 24 MMOL/L (ref 21–28)
HCT VFR BLD AUTO: 40.6 % (ref 35–47)
HGB BLD-MCNC: 13.9 G/DL (ref 11.7–15.7)
HOLD SPECIMEN: NORMAL
IMM GRANULOCYTES # BLD: 0 10E3/UL
IMM GRANULOCYTES NFR BLD: 1 %
LACTATE SERPL-SCNC: 1.4 MMOL/L (ref 0.7–2)
LYMPHOCYTES # BLD AUTO: 1 10E3/UL (ref 0.8–5.3)
LYMPHOCYTES NFR BLD AUTO: 24 %
MCH RBC QN AUTO: 27 PG (ref 26.5–33)
MCHC RBC AUTO-ENTMCNC: 34.2 G/DL (ref 31.5–36.5)
MCV RBC AUTO: 79 FL (ref 78–100)
MONOCYTES # BLD AUTO: 0.3 10E3/UL (ref 0–1.3)
MONOCYTES NFR BLD AUTO: 7 %
NEUTROPHILS # BLD AUTO: 2.7 10E3/UL (ref 1.6–8.3)
NEUTROPHILS NFR BLD AUTO: 68 %
NRBC # BLD AUTO: 0 10E3/UL
NRBC BLD AUTO-RTO: 0 /100
O2/TOTAL GAS SETTING VFR VENT: 21 %
PCO2 BLDV: 33 MM HG (ref 40–50)
PH BLDV: 7.47 [PH] (ref 7.32–7.43)
PLATELET # BLD AUTO: 169 10E3/UL (ref 150–450)
PO2 BLDV: 48 MM HG (ref 25–47)
POTASSIUM BLD-SCNC: 3.8 MMOL/L (ref 3.4–5.3)
PROT SERPL-MCNC: 8.4 G/DL (ref 6.8–8.8)
RBC # BLD AUTO: 5.15 10E6/UL (ref 3.8–5.2)
SARS-COV-2 RNA RESP QL NAA+PROBE: POSITIVE
SODIUM SERPL-SCNC: 131 MMOL/L (ref 133–144)
WBC # BLD AUTO: 4 10E3/UL (ref 4–11)

## 2021-09-27 PROCEDURE — 250N000011 HC RX IP 250 OP 636: Performed by: EMERGENCY MEDICINE

## 2021-09-27 PROCEDURE — 80053 COMPREHEN METABOLIC PANEL: CPT | Performed by: EMERGENCY MEDICINE

## 2021-09-27 PROCEDURE — 99284 EMERGENCY DEPT VISIT MOD MDM: CPT | Mod: 25 | Performed by: EMERGENCY MEDICINE

## 2021-09-27 PROCEDURE — 99283 EMERGENCY DEPT VISIT LOW MDM: CPT | Performed by: EMERGENCY MEDICINE

## 2021-09-27 PROCEDURE — C9803 HOPD COVID-19 SPEC COLLECT: HCPCS | Performed by: EMERGENCY MEDICINE

## 2021-09-27 PROCEDURE — 96374 THER/PROPH/DIAG INJ IV PUSH: CPT | Performed by: EMERGENCY MEDICINE

## 2021-09-27 PROCEDURE — 85652 RBC SED RATE AUTOMATED: CPT | Performed by: EMERGENCY MEDICINE

## 2021-09-27 PROCEDURE — 96361 HYDRATE IV INFUSION ADD-ON: CPT | Performed by: EMERGENCY MEDICINE

## 2021-09-27 PROCEDURE — 83605 ASSAY OF LACTIC ACID: CPT | Performed by: EMERGENCY MEDICINE

## 2021-09-27 PROCEDURE — 96375 TX/PRO/DX INJ NEW DRUG ADDON: CPT | Performed by: EMERGENCY MEDICINE

## 2021-09-27 PROCEDURE — 258N000003 HC RX IP 258 OP 636: Performed by: EMERGENCY MEDICINE

## 2021-09-27 PROCEDURE — 71045 X-RAY EXAM CHEST 1 VIEW: CPT

## 2021-09-27 PROCEDURE — 86140 C-REACTIVE PROTEIN: CPT | Performed by: EMERGENCY MEDICINE

## 2021-09-27 PROCEDURE — 82803 BLOOD GASES ANY COMBINATION: CPT | Performed by: EMERGENCY MEDICINE

## 2021-09-27 PROCEDURE — 250N000013 HC RX MED GY IP 250 OP 250 PS 637: Performed by: EMERGENCY MEDICINE

## 2021-09-27 PROCEDURE — 87635 SARS-COV-2 COVID-19 AMP PRB: CPT | Performed by: EMERGENCY MEDICINE

## 2021-09-27 PROCEDURE — 36415 COLL VENOUS BLD VENIPUNCTURE: CPT | Performed by: EMERGENCY MEDICINE

## 2021-09-27 PROCEDURE — 85025 COMPLETE CBC W/AUTO DIFF WBC: CPT | Performed by: EMERGENCY MEDICINE

## 2021-09-27 RX ORDER — BENZONATATE 100 MG/1
100 CAPSULE ORAL 3 TIMES DAILY PRN
Qty: 20 CAPSULE | Refills: 0 | Status: SHIPPED | OUTPATIENT
Start: 2021-09-27

## 2021-09-27 RX ORDER — SODIUM CHLORIDE 9 MG/ML
INJECTION, SOLUTION INTRAVENOUS CONTINUOUS
Status: DISCONTINUED | OUTPATIENT
Start: 2021-09-27 | End: 2021-09-27 | Stop reason: HOSPADM

## 2021-09-27 RX ORDER — DEXAMETHASONE 6 MG/1
6 TABLET ORAL DAILY
Qty: 7 TABLET | Refills: 0 | Status: SHIPPED | OUTPATIENT
Start: 2021-09-27 | End: 2021-10-04

## 2021-09-27 RX ORDER — KETOROLAC TROMETHAMINE 15 MG/ML
15 INJECTION, SOLUTION INTRAMUSCULAR; INTRAVENOUS ONCE
Status: COMPLETED | OUTPATIENT
Start: 2021-09-27 | End: 2021-09-27

## 2021-09-27 RX ORDER — ACETAMINOPHEN 500 MG
500 TABLET ORAL ONCE
Status: COMPLETED | OUTPATIENT
Start: 2021-09-27 | End: 2021-09-27

## 2021-09-27 RX ORDER — ONDANSETRON 2 MG/ML
4 INJECTION INTRAMUSCULAR; INTRAVENOUS EVERY 30 MIN PRN
Status: DISCONTINUED | OUTPATIENT
Start: 2021-09-27 | End: 2021-09-27 | Stop reason: HOSPADM

## 2021-09-27 RX ADMIN — ONDANSETRON 4 MG: 2 INJECTION INTRAMUSCULAR; INTRAVENOUS at 10:37

## 2021-09-27 RX ADMIN — ACETAMINOPHEN 500 MG: 500 TABLET, FILM COATED ORAL at 10:34

## 2021-09-27 RX ADMIN — SODIUM CHLORIDE 1000 ML: 9 INJECTION, SOLUTION INTRAVENOUS at 11:21

## 2021-09-27 RX ADMIN — SODIUM CHLORIDE 1000 ML: 9 INJECTION, SOLUTION INTRAVENOUS at 10:37

## 2021-09-27 RX ADMIN — KETOROLAC TROMETHAMINE 15 MG: 15 INJECTION, SOLUTION INTRAMUSCULAR; INTRAVENOUS at 10:38

## 2021-09-27 ASSESSMENT — MIFFLIN-ST. JEOR: SCORE: 1721.99

## 2021-09-27 NOTE — ED PROVIDER NOTES
History     Chief Complaint   Patient presents with     Cough     HPI  Rosa Vera is a 26 year old female who presents to the ER secondary to multiple symptoms.  She has had cough, weakness, not sleeping, diarrhea, shortness of breath for 4 days.  hhistory of obesity, GDM otherwise healthy.   She lives only with her 5 yr old son.  She gets short of breath with activity.  Has not been eating much.  Urinating normal amounts.  She has had a fever. She has generalized abdominal discomfort and no dysuria.     Allergies:  No Known Allergies    Problem List:    Patient Active Problem List    Diagnosis Date Noted     Class 3 obesity due to excess calories without serious comorbidity with body mass index (BMI) of 40.0 to 44.9 in adult 12/27/2017     Priority: Medium     Surveillance of contraceptive pill 12/27/2017     Priority: Medium     History of gestational diabetes mellitus 02/24/2016     Priority: Medium     Edema 12/21/2015     Priority: Medium        Past Medical History:    Past Medical History:   Diagnosis Date     GDM, class A2 12/2/2015     NO ACTIVE PROBLEMS (aka NONE)        Past Surgical History:    Past Surgical History:   Procedure Laterality Date     NO HISTORY OF SURGERY         Family History:    Family History   Problem Relation Age of Onset     Hypertension Father      Diabetes Father      Diabetes Maternal Grandmother      Diabetes Other         Cousin, GDM and Type 2       Social History:  Marital Status:  Single [1]  Social History     Tobacco Use     Smoking status: Never Smoker     Smokeless tobacco: Never Used     Tobacco comment: Mike MOORE smokes   Substance Use Topics     Alcohol use: No     Alcohol/week: 0.0 standard drinks     Drug use: No        Medications:    benzonatate (TESSALON) 100 MG capsule  BLISOVI FE 1/20 1-20 MG-MCG tablet  dexamethasone (DECADRON) 6 MG tablet  DM-Doxylamine-Acetaminophen (NYQUIL HBP COLD & FLU) 15-6. MG/15ML LIQD  Phenylephrine-DM-GG-APAP (DAYQUIL  "SEVERE + VAPOCOOL) 5--325 MG/15ML LIQD          Review of Systems   All other systems reviewed and are negative.      Physical Exam   BP: (!) 137/98  Pulse: 120  Temp: 100.3  F (37.9  C)  Resp: 23  Height: 157.5 cm (5' 2\")  Weight: 103.9 kg (229 lb)  SpO2: 91 %      Physical Exam  Vitals and nursing note reviewed.   Constitutional:       General: She is not in acute distress.     Appearance: She is well-developed. She is not diaphoretic.   HENT:      Head: Normocephalic and atraumatic.      Right Ear: External ear normal.      Left Ear: External ear normal.      Nose: Congestion present. No rhinorrhea.      Mouth/Throat:      Mouth: Mucous membranes are dry.   Eyes:      General: No scleral icterus.     Extraocular Movements: Extraocular movements intact.      Conjunctiva/sclera: Conjunctivae normal.      Pupils: Pupils are equal, round, and reactive to light.   Cardiovascular:      Rate and Rhythm: Tachycardia present.   Pulmonary:      Effort: Pulmonary effort is normal.      Breath sounds: Rhonchi present. No wheezing or rales.      Comments: Decreased breath sounds throughout  Abdominal:      Tenderness: There is no abdominal tenderness.   Musculoskeletal:      Cervical back: Normal range of motion and neck supple.   Skin:     General: Skin is warm and dry.      Coloration: Skin is not pale.      Findings: No erythema or rash.   Neurological:      Mental Status: She is alert and oriented to person, place, and time.         ED Course        Procedures           Results for orders placed or performed during the hospital encounter of 09/27/21 (from the past 24 hour(s))   Comprehensive metabolic panel   Result Value Ref Range    Sodium 131 (L) 133 - 144 mmol/L    Potassium 3.8 3.4 - 5.3 mmol/L    Chloride 99 94 - 109 mmol/L    Carbon Dioxide (CO2) 25 20 - 32 mmol/L    Anion Gap 7 3 - 14 mmol/L    Urea Nitrogen 7 7 - 30 mg/dL    Creatinine 0.69 0.52 - 1.04 mg/dL    Calcium 8.3 (L) 8.5 - 10.1 mg/dL    Glucose " 260 (H) 70 - 99 mg/dL    Alkaline Phosphatase 77 40 - 150 U/L    AST 44 0 - 45 U/L    ALT 28 0 - 50 U/L    Protein Total 8.4 6.8 - 8.8 g/dL    Albumin 3.0 (L) 3.4 - 5.0 g/dL    Bilirubin Total 0.7 0.2 - 1.3 mg/dL    GFR Estimate >90 >60 mL/min/1.73m2   CBC with platelets differential    Narrative    The following orders were created for panel order CBC with platelets differential.  Procedure                               Abnormality         Status                     ---------                               -----------         ------                     CBC with platelets and d...[[id number]]                      Final result                 Please view results for these tests on the individual orders.   Lactic acid whole blood STAT   Result Value Ref Range    Lactic Acid 1.4 0.7 - 2.0 mmol/L   Blood gas venous   Result Value Ref Range    pH Venous 7.47 (H) 7.32 - 7.43    pCO2 Venous 33 (L) 40 - 50 mm Hg    pO2 Venous 48 (H) 25 - 47 mm Hg    Bicarbonate Venous 24 21 - 28 mmol/L    Base Excess/Deficit (+/-) 1.0 -7.7 - 1.9 mmol/L    FIO2 21    CRP inflammation   Result Value Ref Range    CRP Inflammation 160.0 (H) 0.0 - 8.0 mg/L   Erythrocyte sedimentation rate auto   Result Value Ref Range    Erythrocyte Sedimentation Rate 57 (H) 0 - 20 mm/hr   CBC with platelets and differential   Result Value Ref Range    WBC Count 4.0 4.0 - 11.0 10e3/uL    RBC Count 5.15 3.80 - 5.20 10e6/uL    Hemoglobin 13.9 11.7 - 15.7 g/dL    Hematocrit 40.6 35.0 - 47.0 %    MCV 79 78 - 100 fL    MCH 27.0 26.5 - 33.0 pg    MCHC 34.2 31.5 - 36.5 g/dL    RDW 12.1 10.0 - 15.0 %    Platelet Count 169 150 - 450 10e3/uL    % Neutrophils 68 %    % Lymphocytes 24 %    % Monocytes 7 %    % Eosinophils 0 %    % Basophils 0 %    % Immature Granulocytes 1 %    NRBCs per 100 WBC 0 <1 /100    Absolute Neutrophils 2.7 1.6 - 8.3 10e3/uL    Absolute Lymphocytes 1.0 0.8 - 5.3 10e3/uL    Absolute Monocytes 0.3 0.0 - 1.3 10e3/uL    Absolute Eosinophils 0.0 0.0 - 0.7  10e3/uL    Absolute Basophils 0.0 0.0 - 0.2 10e3/uL    Absolute Immature Granulocytes 0.0 <=0.0 10e3/uL    Absolute NRBCs 0.0 10e3/uL   Extra Tube    Narrative    The following orders were created for panel order Extra Tube.  Procedure                               Abnormality         Status                     ---------                               -----------         ------                     Extra Blood Culture Bottle[[id number]]                       Final result               Extra Blue Top Tube[[id number]]                              Final result               Extra Green Top (Lithium...[[id number]]                      Final result                 Please view results for these tests on the individual orders.   Extra Blood Culture Bottle   Result Value Ref Range    Hold Specimen JIC    Extra Blue Top Tube   Result Value Ref Range    Hold Specimen JIC    Extra Green Top (Lithium Heparin) Tube   Result Value Ref Range    Hold Specimen JIC    Symptomatic COVID-19 Virus (Coronavirus) by PCR Nasopharyngeal    Specimen: Nasopharyngeal; Swab   Result Value Ref Range    SARS CoV2 PCR Positive (A) Negative    Narrative    Testing was performed using the lg  SARS-CoV-2 & Influenza A/B Assay on the lg  [employer]aud[employer]  System.  This test should be ordered for the detection of SARS-COV-2 in individuals who meet SARS-CoV-2 clinical and/or epidemiological criteria. Test performance is unknown in asymptomatic patients.  This test is for in vitro diagnostic use under the FDA EUA for laboratories certified under CLIA to perform moderate and/or high complexity testing. This test has not been FDA cleared or approved.  A negative test does not rule out the presence of PCR inhibitors in the specimen or target RNA in concentration below the limit of detection for the assay. The possibility of a false negative should be considered if the patient's recent exposure or clinical presentation suggests COVID-19.  [facility]  are certified under the Clinical Laboratory Improvement Amendments of 1988 (CLIA-88) as qualified to perform moderate and/or high complexity laboratory testing.   XR Chest Port 1 View    Narrative    CHEST ONE VIEW PORTABLE   9/27/2021 11:06 AM     HISTORY: Cough, shortness of breath, COVID symptoms.    COMPARISON: Chest x-rays dated 2/5/2017.    FINDINGS:  Lungs are hypoaerated. Patchy densities in the bilateral  lungs have a pattern which can be associated with COVID pneumonia. No  pneumothorax, significant pleural fluid collection or significant  pulmonary edema. The heart is normal in size. No acute osseous  fracture.       Impression    IMPRESSION: Hypoaeration. Slightly increased interstitial markings in  the bilateral lungs in a pattern which can be seen with COVID-19  pneumonia.       Medications   sodium chloride (PF) 0.9% PF flush 3 mL (has no administration in time range)   sodium chloride (PF) 0.9% PF flush 3 mL (has no administration in time range)   0.9% sodium chloride BOLUS (0 mLs Intravenous Stopped 9/27/21 1121)     Followed by   sodium chloride 0.9% infusion (has no administration in time range)   ondansetron (ZOFRAN) injection 4 mg (4 mg Intravenous Given 9/27/21 1037)   0.9% sodium chloride BOLUS (0 mLs Intravenous Stopped 9/27/21 1217)   acetaminophen (TYLENOL) tablet 500 mg (500 mg Oral Given 9/27/21 1034)   ketorolac (TORADOL) injection 15 mg (15 mg Intravenous Given 9/27/21 1038)       Assessments & Plan (with Medical Decision Making)  covid like illness  Ivf, toradol, zofran ordered.  Patient feeling better.  Oxygen saturations 95% on room air.  I re-evaluated the patient and she is a little nervous to go home.   We will check her oxygen with activity in the room.   Patient remained with saturations in the mid 90s even with activity.  Safe for discharge home at this point.  Will give decadron and tessalon perles.  Return to er precautions and follow up precautions discussed.        I have  reviewed the nursing notes.    I have reviewed the findings, diagnosis, plan and need for follow up with the patient.      New Prescriptions    BENZONATATE (TESSALON) 100 MG CAPSULE    Take 1 capsule (100 mg) by mouth 3 times daily as needed for cough    DEXAMETHASONE (DECADRON) 6 MG TABLET    Take 1 tablet (6 mg) by mouth daily for 7 days       Final diagnoses:   2019 novel coronavirus disease (COVID-19)       9/27/2021   [facility] EMERGENCY DEPT     Roberto Stuart MD  09/27/21 2825

## 2021-09-27 NOTE — TELEPHONE ENCOUNTER
Caller is complaining of cough, difficulty speaking and body aches. Caller says she has covid. Symptoms. Triage guidelines recommend to go to ED. Caller verbalized and understands directives.  COVID 19 Nurse Triage Plan/Patient Instructions    Please be aware that novel coronavirus (COVID-19) may be circulating in the community. If you develop symptoms such as fever, cough, or SOB or if you have concerns about the presence of another infection including coronavirus (COVID-19), please contact your health care provider or visit [email or web address].     Disposition/Instructions    ED Visit recommended. Follow protocol based instructions.     Bring Your Own Device:  Please also bring your smart device(s) (smart phones, tablets, laptops) and their charging cables for your personal use and to communicate with your care team during your visit.    Thank you for taking steps to prevent the spread of this virus.  o Limit your contact with others.  o Wear a simple mask to cover your cough.  o Wash your hands well and often.    Resources    [facility]: About COVID-19: [email or web address]    CDC: What to Do If You're Sick: www.cdc.gov/coronavirus/2019-ncov/about/steps-when-sick.html    CDC: Ending Home Isolation: www.cdc.gov/coronavirus/2019-ncov/hcp/disposition-in-home-patients.html     CDC: Caring for Someone: www.cdc.gov/coronavirus/2019-ncov/if-you-are-sick/care-for-someone.html     [facility]: Interim Guidance for Hospital Discharge to Home: www.health.[facility].[place]/diseases/coronavirus/hcp/hospdischarge.pdf    [facility] clinical trials (COVID-19 research studies): [email or web address]     Below are the COVID-19 hotlines at the [place] Department of [facility] ([facility]). Interpreters are available.   o For health questions: Call 149-326-9429 or 1-176.398.7856 (7 a.m. to 7 p.m.)  o For questions about schools and childcare: Call 618-843-5037 or 1-691.236.6117 (7 a.m. to 7  p.m.)                     Reason for Disposition    MODERATE difficulty breathing (e.g., speaks in phrases, SOB even at rest, pulse 100-120)    Additional Information    Negative: SEVERE difficulty breathing (e.g., struggling for each breath, speaks in single words)    Negative: Difficult to awaken or acting confused (e.g., disoriented, slurred speech)    Negative: Bluish (or gray) lips or face now    Negative: Shock suspected (e.g., cold/pale/clammy skin, too weak to stand, low BP, rapid pulse)    Negative: Sounds like a life-threatening emergency to the triager    Negative: [1] COVID-19 exposure AND [2] has not completed COVID-19 vaccine series AND [3] no symptoms    Negative: [1] COVID-19 exposure AND [2] completed COVID-19 vaccine series (fully vaccinated) AND [3] no symptoms    Negative: COVID-19 vaccine reaction suspected (e.g., fever, headache, muscle aches) occurring during days 1-3 after getting vaccine    Negative: COVID-19 vaccine, questions about    Negative: [1] COVID-19 vaccine series completed (fully vaccinated) in past 3 months AND [2] new-onset of COVID-19 symptoms BUT [3] no known exposure    Negative: [1] Had lab test confirmed COVID-19 infection within last 3 monthsAND [2] new-onset of COVID-19 symptoms BUT [3] no known exposure    Negative: [1] Lives with someone known to have influenza (flu test positive) AND [2] flu-like symptoms (e.g., cough, runny nose, sore throat, SOB; with or without fever)    Negative: [1] Adult with possible COVID-19 symptoms AND [2] triager concerned about severity of symptoms or other causes    Negative: COVID-19 and breastfeeding, questions about    Negative: SEVERE or constant chest pain or pressure (Exception: mild central chest pain, present only when coughing)    Protocols used: CORONAVIRUS (COVID-19) DIAGNOSED OR [id number] 3.25

## 2021-09-27 NOTE — DISCHARGE INSTRUCTIONS
Return to the emergency department if your breathing worsens or you develop new symptoms.  I am glad your oxygen did not drop as you are moving around.  Quarantine yourself until at least 14 days after you began being ill.  I hope you get better quickly.  Take the steroid medication and Tessalon Perles for the cough.  I sent the prescription to the pharmacy here.

## 2021-09-27 NOTE — ED TRIAGE NOTES
Cough, weakness, not sleeping and diarrhea with shortness of breath for the past 4 days. Unknown fever

## 2021-09-30 ENCOUNTER — HOSPITAL ENCOUNTER (EMERGENCY)
Facility: CLINIC | Age: 26
Discharge: HOME | End: 2021-09-30
Attending: NURSE PRACTITIONER | Admitting: NURSE PRACTITIONER
Payer: OTHER GOVERNMENT

## 2021-09-30 ENCOUNTER — PATIENT OUTREACH (OUTPATIENT)
Dept: CARE COORDINATION | Facility: CLINIC | Age: 26
End: 2021-09-30

## 2021-09-30 VITALS
WEIGHT: 211.6 LBS | TEMPERATURE: 98 F | RESPIRATION RATE: 18 BRPM | DIASTOLIC BLOOD PRESSURE: 89 MMHG | OXYGEN SATURATION: 96 % | SYSTOLIC BLOOD PRESSURE: 134 MMHG | HEART RATE: 82 BPM | BODY MASS INDEX: 38.7 KG/M2

## 2021-09-30 DIAGNOSIS — U07.1 INFECTION DUE TO 2019 NOVEL CORONAVIRUS: ICD-10-CM

## 2021-09-30 DIAGNOSIS — R73.9 HYPERGLYCEMIA: ICD-10-CM

## 2021-09-30 DIAGNOSIS — R06.02 SHORTNESS OF BREATH: ICD-10-CM

## 2021-09-30 LAB
ALBUMIN SERPL-MCNC: 2.8 G/DL (ref 3.4–5)
ALP SERPL-CCNC: 71 U/L (ref 40–150)
ALT SERPL W P-5'-P-CCNC: 30 U/L (ref 0–50)
ANION GAP SERPL CALCULATED.3IONS-SCNC: 8 MMOL/L (ref 3–14)
AST SERPL W P-5'-P-CCNC: 44 U/L (ref 0–45)
BASOPHILS # BLD MANUAL: 0 10E3/UL (ref 0–0.2)
BASOPHILS NFR BLD MANUAL: 0 %
BILIRUB SERPL-MCNC: 0.5 MG/DL (ref 0.2–1.3)
BUN SERPL-MCNC: 16 MG/DL (ref 7–30)
CALCIUM SERPL-MCNC: 8.2 MG/DL (ref 8.5–10.1)
CHLORIDE BLD-SCNC: 105 MMOL/L (ref 94–109)
CO2 SERPL-SCNC: 23 MMOL/L (ref 20–32)
CREAT SERPL-MCNC: 0.56 MG/DL (ref 0.52–1.04)
D DIMER PPP FEU-MCNC: 0.43 UG/ML FEU (ref 0–0.5)
EOSINOPHIL # BLD MANUAL: 0 10E3/UL (ref 0–0.7)
EOSINOPHIL NFR BLD MANUAL: 0 %
ERYTHROCYTE [DISTWIDTH] IN BLOOD BY AUTOMATED COUNT: 12 % (ref 10–15)
GFR SERPL CREATININE-BSD FRML MDRD: >90 ML/MIN/1.73M2
GLUCOSE BLD-MCNC: 314 MG/DL (ref 70–99)
HCT VFR BLD AUTO: 39.2 % (ref 35–47)
HGB BLD-MCNC: 13.4 G/DL (ref 11.7–15.7)
LACTATE SERPL-SCNC: 1.3 MMOL/L (ref 0.7–2)
LYMPHOCYTES # BLD MANUAL: 0.8 10E3/UL (ref 0.8–5.3)
LYMPHOCYTES NFR BLD MANUAL: 10 %
MCH RBC QN AUTO: 27.2 PG (ref 26.5–33)
MCHC RBC AUTO-ENTMCNC: 34.2 G/DL (ref 31.5–36.5)
MCV RBC AUTO: 80 FL (ref 78–100)
MONOCYTES # BLD MANUAL: 0.2 10E3/UL (ref 0–1.3)
MONOCYTES NFR BLD MANUAL: 2 %
NEUTROPHILS # BLD MANUAL: 7.4 10E3/UL (ref 1.6–8.3)
NEUTROPHILS NFR BLD MANUAL: 88 %
PLAT MORPH BLD: ABNORMAL
PLATELET # BLD AUTO: 308 10E3/UL (ref 150–450)
POTASSIUM BLD-SCNC: 3.9 MMOL/L (ref 3.4–5.3)
PROT SERPL-MCNC: 7.8 G/DL (ref 6.8–8.8)
RBC # BLD AUTO: 4.93 10E6/UL (ref 3.8–5.2)
RBC MORPH BLD: ABNORMAL
SODIUM SERPL-SCNC: 136 MMOL/L (ref 133–144)
TROPONIN I SERPL-MCNC: <0.015 UG/L (ref 0–0.04)
VARIANT LYMPHS BLD QL SMEAR: PRESENT
WBC # BLD AUTO: 8.4 10E3/UL (ref 4–11)

## 2021-09-30 PROCEDURE — 258N000003 HC RX IP 258 OP 636: Performed by: NURSE PRACTITIONER

## 2021-09-30 PROCEDURE — 82040 ASSAY OF SERUM ALBUMIN: CPT | Performed by: NURSE PRACTITIONER

## 2021-09-30 PROCEDURE — 83605 ASSAY OF LACTIC ACID: CPT | Performed by: NURSE PRACTITIONER

## 2021-09-30 PROCEDURE — 96360 HYDRATION IV INFUSION INIT: CPT | Performed by: NURSE PRACTITIONER

## 2021-09-30 PROCEDURE — 85027 COMPLETE CBC AUTOMATED: CPT | Performed by: NURSE PRACTITIONER

## 2021-09-30 PROCEDURE — 250N000013 HC RX MED GY IP 250 OP 250 PS 637: Performed by: NURSE PRACTITIONER

## 2021-09-30 PROCEDURE — 94640 AIRWAY INHALATION TREATMENT: CPT | Performed by: NURSE PRACTITIONER

## 2021-09-30 PROCEDURE — 85379 FIBRIN DEGRADATION QUANT: CPT | Performed by: NURSE PRACTITIONER

## 2021-09-30 PROCEDURE — 36415 COLL VENOUS BLD VENIPUNCTURE: CPT | Performed by: NURSE PRACTITIONER

## 2021-09-30 PROCEDURE — 84484 ASSAY OF TROPONIN QUANT: CPT | Performed by: NURSE PRACTITIONER

## 2021-09-30 PROCEDURE — 99284 EMERGENCY DEPT VISIT MOD MDM: CPT | Performed by: NURSE PRACTITIONER

## 2021-09-30 PROCEDURE — 99283 EMERGENCY DEPT VISIT LOW MDM: CPT | Mod: 25 | Performed by: NURSE PRACTITIONER

## 2021-09-30 RX ORDER — ACETAMINOPHEN 325 MG/1
650 TABLET ORAL ONCE
Status: COMPLETED | OUTPATIENT
Start: 2021-09-30 | End: 2021-09-30

## 2021-09-30 RX ORDER — INHALER, ASSIST DEVICES
SPACER (EA) MISCELLANEOUS
Qty: 1 EACH | Refills: 0 | Status: SHIPPED | OUTPATIENT
Start: 2021-09-30

## 2021-09-30 RX ORDER — INHALER, ASSIST DEVICES
1 SPACER (EA) MISCELLANEOUS ONCE
Status: DISCONTINUED | OUTPATIENT
Start: 2021-09-30 | End: 2021-09-30 | Stop reason: HOSPADM

## 2021-09-30 RX ORDER — ALBUTEROL SULFATE 90 UG/1
2 AEROSOL, METERED RESPIRATORY (INHALATION) ONCE
Status: COMPLETED | OUTPATIENT
Start: 2021-09-30 | End: 2021-09-30

## 2021-09-30 RX ADMIN — ALBUTEROL SULFATE 2 PUFF: 90 AEROSOL, METERED RESPIRATORY (INHALATION) at 16:49

## 2021-09-30 RX ADMIN — ACETAMINOPHEN 650 MG: 325 TABLET, FILM COATED ORAL at 15:43

## 2021-09-30 RX ADMIN — SODIUM CHLORIDE 1000 ML: 9 INJECTION, SOLUTION INTRAVENOUS at 15:42

## 2021-09-30 NOTE — TELEPHONE ENCOUNTER
Called patient. Conversation interrupted at times due to harsh dry cough. Patient rechecked home oxygen reading during call with result of 84 and 85% with minimal activity from going from laying down to sitting up. Advised patient to return to emergency room for additional support. She agrees with plan.

## 2021-09-30 NOTE — ED NOTES
Upon rooming patient, oxygen sa ts dropped to 89 percent with a coughing jag.  Oxygen 2 liters started.

## 2021-09-30 NOTE — ED PROVIDER NOTES
History     Chief Complaint   Patient presents with     Covid Concern     HPI  Rosa Vera is a 26 year old female who presents to the emergency department for evaluation of increased shortness of breath.  Patient is positive for COVID-19.  Symptoms started 1 week ago.  Patient evaluated here 3 days ago.  At that time was stable, no hypoxia.  She is speaking with a staff member from the [employer] and had a coughing spell.  She checked her oximeter reading and it was 84-85% on room air.  She was advised to come to the emergency department for evaluation.  She reports nausea, but no vomiting.  Decreased appetite.  She is having diarrhea.  No history of asthma.  She is a non-smoker.  She has been treating her symptoms with Tylenol, but did not take any today.  She was given prescription for dexamethasone when she was here 3 days ago and she has been taking that as prescribed.  She takes no other medications.    Allergies:  No Known Allergies    Problem List:    Patient Active Problem List    Diagnosis Date Noted     Class 3 obesity due to excess calories without serious comorbidity with body mass index (BMI) of 40.0 to 44.9 in adult 12/27/2017     Priority: Medium     Surveillance of contraceptive pill 12/27/2017     Priority: Medium     History of gestational diabetes mellitus 02/24/2016     Priority: Medium     Edema 12/21/2015     Priority: Medium        Past Medical History:    Past Medical History:   Diagnosis Date     GDM, class A2 12/2/2015     NO ACTIVE PROBLEMS (aka NONE)        Past Surgical History:    Past Surgical History:   Procedure Laterality Date     NO HISTORY OF SURGERY         Family History:    Family History   Problem Relation Age of Onset     Hypertension Father      Diabetes Father      Diabetes Maternal Grandmother      Diabetes Other         Cousin, GDM and Type 2       Social History:  Marital Status:  Single [1]  Social History     Tobacco Use     Smoking status: Never Smoker      Smokeless tobacco: Never Used     Tobacco comment: S.OSuzanne Mike smokes   Substance Use Topics     Alcohol use: No     Alcohol/week: 0.0 standard drinks     Drug use: No        Medications:    spacer (OPTICHAMBER ALISON) holding chamber  benzonatate (TESSALON) 100 MG capsule  BLISOVI FE 1/20 1-20 MG-MCG tablet  dexamethasone (DECADRON) 6 MG tablet  DM-Doxylamine-Acetaminophen (NYQUIL HBP COLD & FLU) 15-6. MG/15ML LIQD  Phenylephrine-DM-GG-APAP (DAYQUIL SEVERE + VAPOCOOL) 5--325 MG/15ML LIQD          Review of Systems  As mentioned above in the history present illness. All other systems were reviewed and are negative.    Physical Exam   BP: (!) 132/100 (right arm, regular long cuff)  Pulse: 85  Temp: 98  F (36.7  C)  Resp: (!) 36  Weight: 96 kg (211 lb 9.6 oz)  SpO2: 95 %      Physical Exam  Constitutional:       General: She is in acute distress.      Appearance: She is well-developed. She is ill-appearing.   HENT:      Head: Normocephalic and atraumatic.      Right Ear: External ear normal.      Left Ear: External ear normal.      Nose: Nose normal.      Mouth/Throat:      Mouth: Mucous membranes are moist.      Pharynx: No oropharyngeal exudate.   Eyes:      Conjunctiva/sclera: Conjunctivae normal.   Cardiovascular:      Rate and Rhythm: Normal rate and regular rhythm.      Heart sounds: Normal heart sounds. No murmur heard.     Pulmonary:      Effort: Pulmonary effort is normal. No respiratory distress.      Breath sounds: Normal breath sounds.   Musculoskeletal:         General: Normal range of motion.   Skin:     General: Skin is warm and dry.      Findings: No rash.   Neurological:      General: No focal deficit present.      Mental Status: She is alert and oriented to person, place, and time.         ED Course        Procedures              Results for orders placed or performed during the hospital encounter of 09/30/21 (from the past 24 hour(s))   CBC with platelets differential    Narrative    The  following orders were created for panel order CBC with platelets differential.  Procedure                               Abnormality         Status                     ---------                               -----------         ------                     CBC with platelets and d...[[id number]]  Normal              Final result               Manual Differential[[id number]]          Abnormal            Final result                 Please view results for these tests on the individual orders.   Comprehensive metabolic panel   Result Value Ref Range    Sodium 136 133 - 144 mmol/L    Potassium 3.9 3.4 - 5.3 mmol/L    Chloride 105 94 - 109 mmol/L    Carbon Dioxide (CO2) 23 20 - 32 mmol/L    Anion Gap 8 3 - 14 mmol/L    Urea Nitrogen 16 7 - 30 mg/dL    Creatinine 0.56 0.52 - 1.04 mg/dL    Calcium 8.2 (L) 8.5 - 10.1 mg/dL    Glucose 314 (H) 70 - 99 mg/dL    Alkaline Phosphatase 71 40 - 150 U/L    AST 44 0 - 45 U/L    ALT 30 0 - 50 U/L    Protein Total 7.8 6.8 - 8.8 g/dL    Albumin 2.8 (L) 3.4 - 5.0 g/dL    Bilirubin Total 0.5 0.2 - 1.3 mg/dL    GFR Estimate >90 >60 mL/min/1.73m2   Troponin I   Result Value Ref Range    Troponin I <0.015 0.000 - 0.045 ug/L   D dimer quantitative   Result Value Ref Range    D-Dimer Quantitative 0.43 0.00 - 0.50 ug/mL FEU    Narrative    This D-dimer assay is intended for use in conjunction with a clinical pretest probability assessment model to exclude pulmonary embolism (PE) and deep venous thrombosis (DVT) in outpatients suspected of PE or DVT. The cut-off value is 0.50 ug/mL FEU.   CBC with platelets and differential   Result Value Ref Range    WBC Count 8.4 4.0 - 11.0 10e3/uL    RBC Count 4.93 3.80 - 5.20 10e6/uL    Hemoglobin 13.4 11.7 - 15.7 g/dL    Hematocrit 39.2 35.0 - 47.0 %    MCV 80 78 - 100 fL    MCH 27.2 26.5 - 33.0 pg    MCHC 34.2 31.5 - 36.5 g/dL    RDW 12.0 10.0 - 15.0 %    Platelet Count 308 150 - 450 10e3/uL   Manual Differential   Result Value Ref Range    % Neutrophils 88 %     % Lymphocytes 10 %    % Monocytes 2 %    % Eosinophils 0 %    % Basophils 0 %    Absolute Neutrophils 7.4 1.6 - 8.3 10e3/uL    Absolute Lymphocytes 0.8 0.8 - 5.3 10e3/uL    Absolute Monocytes 0.2 0.0 - 1.3 10e3/uL    Absolute Eosinophils 0.0 0.0 - 0.7 10e3/uL    Absolute Basophils 0.0 0.0 - 0.2 10e3/uL    RBC Morphology Confirmed RBC Indices     Platelet Assessment  Automated Count Confirmed. Platelet morphology is normal.     Automated Count Confirmed. Platelet morphology is normal.    Reactive Lymphocytes Present (A) None Seen   Lactic acid whole blood   Result Value Ref Range    Lactic Acid 1.3 0.7 - 2.0 mmol/L       Medications   aerochamber with mouthpiece (NO mask) - > 5 years 1 each (has no administration in time range)   0.9% sodium chloride BOLUS (0 mLs Intravenous Stopped 9/30/21 1643)   acetaminophen (TYLENOL) tablet 650 mg (650 mg Oral Given 9/30/21 1543)   albuterol (PROAIR HFA/PROVENTIL HFA/VENTOLIN HFA) 108 (90 Base) MCG/ACT inhaler 2 puff (2 puffs Inhalation Given 9/30/21 1649)       Assessments & Plan (with Medical Decision Making)   26-year-old female with COVID-19 virus infection.  Symptoms started 1 week ago.  She was evaluated here 3 days ago started on dexamethasone.  She returns today because her oximeter readings were low at 84% at home and feeling more short of breath.   On exam she is ill-appearing.  She is afebrile.  Normotensive.  No tachycardia.  Lung sounds are CTA.  She was monitored on oximetry here during her entire stay in the emergency department and SPO2 ranged from 93-97% on room air.  She had no episodes of hypoxia.  Labs were reassuring.  Her blood sugar is elevated at 314 and it was 260 days ago.  I have some concern for underlying type 2 diabetes, but she is on oral steroids at this time.  D-dimer is normal, low suspicion for PE.  Lactic acid is normal.    Patient was provided prescription for albuterol inhaler.  Patient was instructed regarding worrisome reasons to return  and she should have recheck in clinic regarding her elevated blood sugar.    I have reviewed the nursing notes.    I have reviewed the findings, diagnosis, plan and need for follow up with the patient.      Discharge Medication List as of 9/30/2021  4:44 PM      START taking these medications    Details   spacer (OPTICHAMBER ALISON) holding chamber Use with albuterol inhaler., Disp-1 each, R-0, E-Prescribe             Final diagnoses:   Infection due to 2019 novel coronavirus   Shortness of breath       9/30/2021   [facility] EMERGENCY DEPT     Tylor, MARSHALL Soni CNP  09/30/21 3006

## 2021-09-30 NOTE — TELEPHONE ENCOUNTER
Received notification of patient's report of home oxygen reading at 86% via [employer]. Called patient and left message. Will make second attempt shortly

## 2021-09-30 NOTE — DISCHARGE INSTRUCTIONS
Your labs are reassuring.  Your oximeter monitoring here was good, and stayed above 90% on room air.  Continue dexamethasone.  Start albuterol inhaler 2 puffs every 4-6 hours as needed for shortness of breath.  Return to the emergency department for chest pain, increased shortness of breath, vomiting, or oximeter readings less than 90% for more than 10 minutes at rest.

## 2021-10-03 ENCOUNTER — HEALTH MAINTENANCE LETTER (OUTPATIENT)
Age: 26
End: 2021-10-03

## 2022-05-15 ENCOUNTER — HEALTH MAINTENANCE LETTER (OUTPATIENT)
Age: 27
End: 2022-05-15

## 2022-09-10 ENCOUNTER — HEALTH MAINTENANCE LETTER (OUTPATIENT)
Age: 27
End: 2022-09-10

## 2023-06-03 ENCOUNTER — HEALTH MAINTENANCE LETTER (OUTPATIENT)
Age: 28
End: 2023-06-03

## 2024-07-07 ENCOUNTER — HEALTH MAINTENANCE LETTER (OUTPATIENT)
Age: 29
End: 2024-07-07